# Patient Record
(demographics unavailable — no encounter records)

---

## 2025-01-23 NOTE — HISTORY OF PRESENT ILLNESS
[FreeTextEntry1] : Pt is here for follow up. Pt reports 1 episode of SSCP last week. The CP radiated to throat, not related to activities, lasting for a few minutes, relieved with drinking water. Pt reports rare transient dizziness. She denies SOB, orthopnea, PND, LE edema, palpitations, or LOC. Her home BP is around 130/70. Today's /79 P 97, repeated /74 P 97.   7/24/24 - Pt reports 8-9 years ago, she had chest discomfort and underwent LHC at [facility] and was told she had "minimal to mild" blockages and no need for stents. She reports history of PVCs for which she is on metoprolol. She has been stable since her LHC. She occasionally has transient mid-sternal chest discomfort, lasting seconds, usually brought on after eating a full meal and improved by drinking cold water. She denies chest pain with exertion. Denies SOB, palpitation, dizziness, LOC, orthopnea, PND, or LE edema. She states she exercises at least 10-15 minutes daily.

## 2025-01-23 NOTE — REASON FOR VISIT
[Symptom and Test Evaluation] : symptom and test evaluation [Hyperlipidemia] : hyperlipidemia [Hypertension] : hypertension [FreeTextEntry1] : 75 year old F with mild CAD (C reportedly 8-9 years ago), HTN, HLD, DM who presents for f/u.  Meds: Janumet  BID, Jardiance 25 daily, ASA 81, atorvastatin 20 daily, losartan-hctz 50-12.5mg daily, Toprol 25 daily

## 2025-01-23 NOTE — HISTORY OF PRESENT ILLNESS
[FreeTextEntry1] : Pt is here for follow up. Pt reports 1 episode of SSCP last week. The CP radiated to throat, not related to activities, lasting for a few minutes, relieved with drinking water. Pt reports rare transient dizziness. She denies SOB, orthopnea, PND, LE edema, palpitations, or LOC. Her home BP is around 130/70. Today's /79 P 97, repeated /74 P 97.   7/24/24 - Pt reports 8-9 years ago, she had chest discomfort and underwent LHC at [facility] and was told she had "minimal to mild" blockages and no need for stents. She reports history of PVCs for which she is on metoprolol. She has been stable since her LHC. She occasionally has transient mid-sternal chest discomfort, lasting seconds, usually brought on after eating a full meal and improved by drinking cold water. She denies chest pain with exertion. Denies SOB, palpitation, dizziness, LOC, orthopnea, PND, or LE edema. She states she exercises at least 10-15 minutes daily.

## 2025-01-23 NOTE — ASSESSMENT
[FreeTextEntry1] : 75 year old F with mild CAD (LHC reportedly 8-9 years ago), HTN, HLD, DM who presents for f/u.  1) Chest discomfort, atypical in nature, radiates up to her throat, not related to exertion 2) CAD, mild - EKG 1/23/25 showed sinus rhythm with no significant abnormalities -  TTE 7/24/24 showed normal LVEF 60-65%, mild LVH, normal RV size/function without significant valvular disease - I advised pt to undergo treadmill stress test to r/o ischemia. Will consider CCTA vs. nuclear stress if unable to achieve target HR or if testing is equivocal - F/U GI evaluation  3) PVC - She reports history of PVCs. Currently without palpitations - Continue metoprolol succinate 25mg daily  4) Follow-up, 1 month, pending treadmill stress

## 2025-02-20 NOTE — REASON FOR VISIT
[Symptom and Test Evaluation] : symptom and test evaluation [Coronary Artery Disease] : coronary artery disease [FreeTextEntry1] : 75 year old F with mild CAD (C reportedly 8-9 years ago), HTN, HLD, DM who presents for f/u.  Meds: Janumet  BID, Jardiance 25 daily, ASA 81, atorvastatin 20 daily, losartan-hctz 50-12.5mg daily, Toprol 25 daily

## 2025-02-20 NOTE — ASSESSMENT
[FreeTextEntry1] : 75 year old F with mild CAD (LHC reportedly 8-9 years ago), HTN, HLD, DM who presents for f/u.  1) Chest discomfort, atypical in nature, radiates up to her throat, not related to exertion 2) CAD, mild - EKG 1/23/25 showed sinus rhythm with no significant abnormalities - TTE 7/24/24 showed normal LVEF 60-65%, mild LVH, normal RV size/function without significant valvular disease - I advised pt to undergo treadmill stress test 2/20/25; pt did 6 min Sivakumar protocol, did not have CP, and had 0.5 mm upsloping ST depressions which are not significant for ischemia. - Pt was reassured. Discussed that if CP worsens/persists, we should pursue coronary CT. Pt prefers to defer CT at this time.  3) PVC - She reports history of PVCs. Currently without palpitations - Continue metoprolol succinate 25mg daily  4) Follow-up, 3 months

## 2025-02-20 NOTE — HISTORY OF PRESENT ILLNESS
[FreeTextEntry1] : Pt reports intermittent mid-sternal chest discomfort, lasting a few minutes and not related to exertion.  1/23/25 - Pt is here for follow up. Pt reports 1 episode of SSCP last week. The CP radiated to throat, not related to activities, lasting for a few minutes, relieved with drinking water. Pt reports rare transient dizziness. She denies SOB, orthopnea, PND, LE edema, palpitations, or LOC. Her home BP is around 130/70. Today's /79 P 97, repeated /74 P 97.  7/24/24 - Pt reports 8-9 years ago, she had chest discomfort and underwent LHC at [facility] and was told she had "minimal to mild" blockages and no need for stents. She reports history of PVCs for which she is on metoprolol. She has been stable since her LHC. She occasionally has transient mid-sternal chest discomfort, lasting seconds, usually brought on after eating a full meal and improved by drinking cold water. She denies chest pain with exertion. Denies SOB, palpitation, dizziness, LOC, orthopnea, PND, or LE edema. She states she exercises at least 10-15 minutes daily.